# Patient Record
Sex: FEMALE | Race: WHITE
[De-identification: names, ages, dates, MRNs, and addresses within clinical notes are randomized per-mention and may not be internally consistent; named-entity substitution may affect disease eponyms.]

---

## 2020-02-14 ENCOUNTER — HOSPITAL ENCOUNTER (OUTPATIENT)
Dept: HOSPITAL 25 - OR | Age: 75
Discharge: HOME | End: 2020-02-14
Attending: ORTHOPAEDIC SURGERY
Payer: MEDICARE

## 2020-02-14 VITALS — DIASTOLIC BLOOD PRESSURE: 86 MMHG | SYSTOLIC BLOOD PRESSURE: 131 MMHG

## 2020-02-14 DIAGNOSIS — M19.012: ICD-10-CM

## 2020-02-14 DIAGNOSIS — M75.102: Primary | ICD-10-CM

## 2020-02-14 DIAGNOSIS — E78.00: ICD-10-CM

## 2020-02-14 DIAGNOSIS — I10: ICD-10-CM

## 2020-02-14 DIAGNOSIS — M24.812: ICD-10-CM

## 2020-02-14 DIAGNOSIS — M75.42: ICD-10-CM

## 2020-02-14 DIAGNOSIS — G89.18: ICD-10-CM

## 2020-02-14 DIAGNOSIS — I34.0: ICD-10-CM

## 2020-02-14 DIAGNOSIS — M75.52: ICD-10-CM

## 2020-02-14 DIAGNOSIS — M75.22: ICD-10-CM

## 2020-02-14 PROCEDURE — C1713 ANCHOR/SCREW BN/BN,TIS/BN: HCPCS

## 2020-02-14 NOTE — OP
OPERATIVE REPORT:

 

DATE OF OPERATION:  02/14/20.

 

DATE OF BIRTH:  [birth year].

 

SURGEON:  Ulisses Billingsley MD.

 

ASSISTANT:  LUCILA Krause.

 

A physician assistant was required for the length of the procedure for 
assistance with patient positioning, instrumentation, and closure.

 

ANESTHESIOLOGIST:  Dr. Oralia Mayo.

 

ANESTHESIA:  General anesthesia, regional interscalene block anesthesia.

 

PRE-OP DIAGNOSES:

1.  Left shoulder rotator cuff tendon tear, supraspinatus, partial thickness.

2.  Left shoulder subacromial impingement and bursitis.

3.  Left shoulder acromioclavicular joint osteoarthritis.

4.  Left shoulder possible biceps tendinosis or superior labral tear.

5.  Left shoulder glenohumeral joint osteoarthritis.

6.  Left shoulder glenohumeral joint loose bodies.

7.  Left shoulder capsulitis, stiffness.

 

POST-OP DIAGNOSES:

1.  Left shoulder rotator cuff tendonitis and partial thickness tearing, 
supraspinatus, low grade, articular-sided and bursal sided.

2.  Left shoulder subacromial impingement and bursitis.

3.  Left shoulder acromioclavicular joint osteoarthritis.

4.  Left shoulder superior labral tear and biceps tendinosis.

5.  Left shoulder glenohumeral joint osteoarthritis with articular cartilage 
flap of glenoid articular cartilage.

6.  No clear loose bodies, left shoulder.

7.  Left shoulder minimal capsulitis, stiffness.

 

OPERATIVE PROCEDURE:

1.  Left shoulder manipulation under anesthesia.

2.  Left shoulder arthroscopic rotator cuff tendon repair, supraspinatus, with 
Regeneten biologic patch.

3.  Left shoulder arthroscopic subacromial decompression.

4.  Left shoulder arthroscopic distal clavicle resection.

5.  Left shoulder arthroscopic release of biceps tendon.

6.  Left shoulder arthroscopic extensive debridement of glenohumeral joint 
including of a large cartilage flap of the glenoid, release of biceps tendon, 
and debridement of significnat subacromial bursitis tissue

 

ANTIBIOTICS:  Ancef 2 g IV.

 

IV FLUIDS:  See Anesthesia note.

 

SKIN-TO-SKIN TIME:  48 minutes.

 

SPECIMEN:  None.

 

IMPLANTS:  [employer] Regeneten biologic patch, size medium.

 

COMPLICATIONS:  None.

 

ESTIMATED BLOOD LOSS:  Minimal.

 

INDICATIONS FOR PROCEDURE:  The patient is a 74-year-old woman with significant 
left shoulder pain with activities of daily living as well as night time pain, 
insufficiently responsive to nonoperative management.  The patient opted for 
surgery.

 

I discussed risks and potential complications of surgery including rotator cuff 
tendon retear, and the possibility for incomplete resolution of pain given the 
patient's glenohumeral joint osteoarthritis.

 

DESCRIPTION OF PROCEDURE:  In preoperative holding, the patient signed a 
written consent.  Operative extremity was marked in preoperative holding.  I 
confirmed that the patient was okay with the biceps release versus tenodesis.  
I preferred biceps release in arthritic shoulders.

 

The patient underwent a regional interscalene nerve block in preoperative 
holding. The patient was taken back to the operating room and placed supine on 
the operating room table, sedated and intubated.

 

A mini time-out was performed.  I performed an examination under anesthesia.  
It revealed range of motion of 170 degrees of forward flexion, 90 degrees 
external, and 80 degrees internal rotation.  I manipulated the shoulder and was 
able to obtain 180 degrees of forward flexion.

 

The patient was placed in the lateral decubitus position with the left shoulder 
up. Axillary roll.  All bony prominences padded.  Longitudinal traction with 10 
pounds with the appropriate amount of shoulder forward flexion and abduction.  
A beanbag was hardened.  Left shoulder was prepped and draped.  Formal surgical 
time-out performed.

 

I entered the glenohumeral joint with a spinal needle from posterior.  I 
infused 30 cc of normal saline.  Atraumatic creation of posterior glenohumeral 
joint portal. I assessed the glenohumeral joint.  There was more arthritic 
change in the glenohumeral joint than I had anticipated preoperatively.  In the 
center of the glenoid and more superiorly than inferiorly, there was grade 4 
articular cartilage loss.  When I looked closely, there was also a flap about 
the anteroinferior glenoid with a full thickness flap of articular cartilage 
lifted up off of the bone.  There was at least grade 1 injury to the articular 
cartilage on the humeral head side.  There was some low grade articular sided 
tearing of the supraspinatus anteriorly visible.  It did not appear to be full 
thickness.  There appeared to be some superior labrum tearing as well.

 

I established an anterior glenohumeral joint portal under direct visualization.
  I confirmed the unstable superior labral tear.  I brought in scissors and cut 
the long head of the biceps tendon near its origin.

 

I probed the rotator cuff and confirmed that there was no full thickness tear.  
The partial thickness under-sided tear was limited to something like 4 mm or so.

 

I probed the articular cartilage flap and it indeed was clearly unstable.  I 
brought in some biters and the shaver, and debrided that back to a stable rim 
of articular cartilage.

 

I removed instruments and fluid from the glenohumeral joint and switched to the 
subacromial space.  I established the anterior and posterior portals.  Under 
direct visualization, I established lateral and then posterolateral portals.  
Subacromial space was significant for a significant amount of bursitis being 
present.  I used an arthroscopic shaver to debride this.  There was significant 
fraying of the bursal side of the supraspinatus clearly some low grade tearing 
present.  I skeletonized the undersurface of the acromion with an arthroscopic 
cautery device, and then I smoothed out, the undersurface of the anterior 
aspect of the acromion with an arthroscopic maral.

 

This flattened out the undersurface of the acromion nicely removing some spurs.

 

I decided to place a Regeneten patch.  I used a medium-sized patch.  I created 
superolateral portal for staple insertion and I widened my lateral portal for 
insertion of the patch.  I held the patch in place with PLLA staples only.  It 
was stable to probing and to movement of the humerus.

 

I moved to the AC joint.  I debrided bursitic tissue with the cautery device 
and then removed 8 mm distal end of the clavicle with an arthroscopic maral.  I 
returned to the patch and placed several additional staples.

 

I removed instruments and fluid from the subacromial space.  Closed skin with 
figure-of-eight and 12 stitches using nylon 3.0 suture.  Xeroform, 4x4s, ABDs, 
and foam tape.  Sling and abduction pillow.

 

DISPOSITION:  The patient was discharged home when medically stable.  She is to 
start physical therapy immediately according to the Regeneten protocol.  I 
stressed with the patient's  how it is important to get that patient 
mobilized with range of motion sooner rather than later given her glenohumeral 
joint osteoarthritis.  She will follow up with in 10 to 14 days 
postoperatively.  She will be in a sling for about 2 weeks postoperatively.  
Percocet as needed for pain control.

 

 [id number]/CPS #: [id number]

[facility]